# Patient Record
Sex: MALE | Race: OTHER
[De-identification: names, ages, dates, MRNs, and addresses within clinical notes are randomized per-mention and may not be internally consistent; named-entity substitution may affect disease eponyms.]

---

## 2018-02-15 ENCOUNTER — HOSPITAL ENCOUNTER (EMERGENCY)
Dept: HOSPITAL 72 - EMR | Age: 68
LOS: 1 days | Discharge: HOME | End: 2018-02-16
Payer: SELF-PAY

## 2018-02-15 VITALS — WEIGHT: 160 LBS | BODY MASS INDEX: 25.11 KG/M2 | HEIGHT: 67 IN

## 2018-02-15 DIAGNOSIS — F10.929: Primary | ICD-10-CM

## 2018-02-15 DIAGNOSIS — E11.9: ICD-10-CM

## 2018-02-15 DIAGNOSIS — I10: ICD-10-CM

## 2018-02-15 PROCEDURE — 96360 HYDRATION IV INFUSION INIT: CPT

## 2018-02-15 PROCEDURE — 99284 EMERGENCY DEPT VISIT MOD MDM: CPT

## 2018-02-15 PROCEDURE — 70450 CT HEAD/BRAIN W/O DYE: CPT

## 2018-02-16 VITALS — DIASTOLIC BLOOD PRESSURE: 67 MMHG | SYSTOLIC BLOOD PRESSURE: 149 MMHG

## 2018-02-16 VITALS — SYSTOLIC BLOOD PRESSURE: 134 MMHG | DIASTOLIC BLOOD PRESSURE: 66 MMHG

## 2018-02-16 VITALS — SYSTOLIC BLOOD PRESSURE: 149 MMHG | DIASTOLIC BLOOD PRESSURE: 67 MMHG

## 2018-02-16 NOTE — EMERGENCY ROOM REPORT
History of Present Illness


General


Chief Complaint:  Altered Level of Consciousness


Source:  Patient, EMS





Present Illness


HPI


67-year-old male presents ED for evaluation.  Patient brought in by EMS altered

, history of EtOH.  Upon arrival patient unable to provide any additional 

history.  No signs of trauma.  No other aggravating or leading factors.  No 

other associated symptoms


Allergies:  


Coded Allergies:  


     NO KNOWN ALLERGIES (Unverified  Allergy, Unknown, 1/8/18)


     No Known Allergies (Unverified , 3/11/15)





Patient History


Past Medical History:  DM, HTN, seizures


Past Surgical History:  none


Pertinent Family History:  none


Social History:  Reports: alcohol use, Denies: smoking, drug use


Immunizations:  UTD


Reviewed Nursing Documentation:  PMH: Agreed, PSxH: Agreed





Nursing Documentation-PMH


Hx Hypertension:  Yes


Hx Diabetes:  Yes


Hx Seizures:  Yes





Review of Systems


All Other Systems:  limited





Physical Exam





Vital Signs








  Date Time  Temp Pulse Resp B/P (MAP) Pulse Ox O2 Delivery O2 Flow Rate FiO2


 


2/15/18 20:42 97.8 77 18 167/71 98 Room Air  





 97.9       








Sp02 EP Interpretation:  reviewed, normal


General Appearance:  lethargic


Head:  normocephalic


Eyes:  bilateral eye normal inspection, bilateral eye PERRL


ENT:  normal ENT inspection


Neck:  normal inspection


Respiratory:  chest non-tender, lungs clear, normal breath sounds, speaking 

full sentences


Cardiovascular #1:  regular rate, rhythm, no edema


Gastrointestinal:  normal bowel sounds, non tender, soft, non-distended, no 

guarding, no rebound


Rectal:  deferred


Genitourinary:  no CVA tenderness


Musculoskeletal:  normal inspection


Neurologic:  alert, oriented x3, responsive, motor strength/tone normal, 

sensory intact, speech normal


Psychiatric:  normal inspection


Skin:  normal inspection


Lymphatic:  normal inspection





Medical Decision Making


Diagnostic Impression:  


 Primary Impression:  


 Alcohol intoxication


 Qualified Codes:  F10.929 - Alcohol use, unspecified with intoxication, 

unspecified


ER Course


Hospital Course 





67-year-old male presents to ED status post EtOH intoxication.





Clinical course


Patient placed on stretcher.  After initial history and physical I ordered CT 

head





CT head unremarkable





Patient allowed to sleep.  My assessment shows no evidence of SI/HI requiring 

psychiatric evaluation.





Patient allowed to rest in now awake alert oriented x3.  ambulating without 

difficulty.  Family is at bedside and can take patient home.





Diagnosis - ETOH intoxication 





stable and discharged to home.  Followup with PMD.  Return to ED if symptoms 

recur or worsen


CT/MRI/US Diagnostic Results


CT/MRI/US Diagnostic Results :  


   Imaging Test Ordered:  CT Head


   Impression


no acute process





Last Vital Signs








  Date Time  Temp Pulse Resp B/P (MAP) Pulse Ox O2 Delivery O2 Flow Rate FiO2


 


2/16/18 05:20 97.9 61 18 149/67 98 Room Air  





 97.9       








Status:  improved


Disposition:  HOME, SELF-CARE


Condition:  Stable


Referrals:  


NOT CHOSEN [facility]/MD,REFERRING (PCP)


Patient Instructions:  Alcohol Intoxication, Easy-to-Read











EBONI SHARP M.D. Feb 16, 2018 05:47

## 2018-02-16 NOTE — DIAGNOSTIC IMAGING REPORT
Indications: Altered mental status

 

Technique: Spiral acquisitions obtained through the brain. Angled axial and coronal 5

x 5 mm slices were reconstructed. Total dose length product 2223.75 mGycm.  CTDI

vol(s) 70.38,70.38 mGy. Dose reduction achieved using automated exposure control

 

Comparison: February 11, 2016

 

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 There is mild

encephalomalacia deep to it, unchanged. No acute intracranial hemorrhage or edema,

mass effect, or midline shift. There is age-related enlargement of the ventricles and

extra axial CSF spaces. There is periventricular deep white matter chronic ischemic

change. Old lacunar infarct is again demonstrated in the left basal ganglia, less

conspicuous than on the prior study Otherwise normal gray-white differentiation.

There is minimal maxillary, ethmoid, and frontal sinus mucosal thickening. The

mastoids are clear. Again demonstrated is hardware repairing old healed left orbital

fracture

 

Impression: Negative for acute intracranial bleed or mass effect

 

Chronic and age-related changes, as described

 

Postsurgical changes of the left frontal region, also previously reported. Underlying

mild encephalomalacia, likewise unchanged

 

Old left basal ganglia lacunar infarct

 

This agrees with the preliminary interpretation provided overnight by [facility]

teleradiology service.

 

The CT scanner at [facility] is accredited by the [employer]

[employer] and the scans are performed using protocols designed to limit radiation

exposure to as low as reasonably achievable to attain images of sufficient resolution

adequate for diagnostic evaluation.